# Patient Record
Sex: FEMALE | Race: OTHER | HISPANIC OR LATINO
[De-identification: names, ages, dates, MRNs, and addresses within clinical notes are randomized per-mention and may not be internally consistent; named-entity substitution may affect disease eponyms.]

---

## 2022-03-30 PROBLEM — Z00.00 ENCOUNTER FOR PREVENTIVE HEALTH EXAMINATION: Status: ACTIVE | Noted: 2022-03-30

## 2022-04-18 ENCOUNTER — APPOINTMENT (OUTPATIENT)
Dept: OTOLARYNGOLOGY | Facility: CLINIC | Age: 32
End: 2022-04-18
Payer: COMMERCIAL

## 2022-04-18 VITALS
TEMPERATURE: 97.8 F | DIASTOLIC BLOOD PRESSURE: 78 MMHG | SYSTOLIC BLOOD PRESSURE: 109 MMHG | WEIGHT: 123 LBS | HEIGHT: 63 IN | HEART RATE: 84 BPM | BODY MASS INDEX: 21.79 KG/M2

## 2022-04-18 DIAGNOSIS — Z72.89 OTHER PROBLEMS RELATED TO LIFESTYLE: ICD-10-CM

## 2022-04-18 DIAGNOSIS — E78.00 PURE HYPERCHOLESTEROLEMIA, UNSPECIFIED: ICD-10-CM

## 2022-04-18 DIAGNOSIS — H69.93 UNSPECIFIED EUSTACHIAN TUBE DISORDER, BILATERAL: ICD-10-CM

## 2022-04-18 DIAGNOSIS — Z78.9 OTHER SPECIFIED HEALTH STATUS: ICD-10-CM

## 2022-04-18 DIAGNOSIS — H93.8X9 OTHER SPECIFIED DISORDERS OF EAR, UNSPECIFIED EAR: ICD-10-CM

## 2022-04-18 PROCEDURE — 92567 TYMPANOMETRY: CPT

## 2022-04-18 PROCEDURE — 99204 OFFICE O/P NEW MOD 45 MIN: CPT

## 2022-04-18 RX ORDER — PSEUDOEPHEDRINE HYDROCHLORIDE 60 MG/1
60 TABLET ORAL
Qty: 30 | Refills: 0 | Status: ACTIVE | COMMUNITY
Start: 2022-04-18 | End: 1900-01-01

## 2022-04-18 NOTE — ASSESSMENT
[FreeTextEntry1] : 31F here for initial evaluation. For the past 4 months, she c/o constellation of sx related to her ears. There is ear fullness/pressure with constant need to pop her ears and/or move/crack her jaw. She also sometimes feels a sensation of 'air' in her ear, usually when she wakes up in the morning. Both ears are affected, but the left is much more noticeable. There is no otorrhea, otalgia, tinnitus or vertigo. She saw an ENT and at that time, exam and audiometry were normal and she was given flonase; she has been on flonase for 3 months without improvement.\par She thinks she may have had covid around the time her sx started, 12/2021. Audiometry from today shows normal right tympanometry and eustachian tube function. The left ear is type Ad with abnormal eustachian tube function, as above. Pure tone testing is normal from prior ENT visit 3 months ago. Complete and comprehensive head and neck exam is unremarkable.\par Sx due to eustachian tube dysfunction. This was discussed at length. Flonase does not help. Can try sudafed and keep sx diary. Also can try  as well for TMJ. Regardless, these sx should likely improve and pt was reassured.

## 2022-04-18 NOTE — PHYSICAL EXAM
[de-identified] : EAC clear, TM intact and mobile, ME clear [Midline] : trachea located in midline position [Normal] : no rashes

## 2022-04-18 NOTE — HISTORY OF PRESENT ILLNESS
[de-identified] : 31F here for initial evaluation.\par \par For the past 4 months, she c/o constellation of sx related to her ears. There is ear fullness/pressure with constant need to pop her ears, move/crack her jaw. She also sometimes feels a sensation of 'air' in her ear, usually when she wakes up in the morning. Both ears are affected, but the left is much more noticeable. There is no otorrhea, otalgia, tinnitus or vertigo. She saw an ENT and at that time, exam and audiometry were normal and she was given flonase. She has been on it for 3 months without improvement.\par There are no other otolaryngologic complaints.\par She thinks she may have had covid around the time her sx started, 12/2021.  \par \par Audiometry from today:\par R ear: type A, normal eustachian tube function\par L ear: type Ad, abnormal and insufficient pressure change w insufflation, normal pressure change w swallow\par \par ROS otherwise unremarkable.

## 2022-09-01 ENCOUNTER — APPOINTMENT (OUTPATIENT)
Dept: GASTROENTEROLOGY | Facility: CLINIC | Age: 32
End: 2022-09-01

## 2022-09-01 PROCEDURE — 99204 OFFICE O/P NEW MOD 45 MIN: CPT

## 2022-09-01 RX ORDER — DOCUSATE SODIUM 100 MG/1
100 CAPSULE, LIQUID FILLED ORAL
Qty: 90 | Refills: 3 | Status: ACTIVE | COMMUNITY
Start: 2022-09-01 | End: 1900-01-01

## 2022-09-02 NOTE — HISTORY OF PRESENT ILLNESS
[de-identified] : 31F on synthroid 50mcg, clomid presents for gi eval for sxs for months \par having excess heartburn especially red wine, red sauce\par at night would need to burp after dinner, going to bed and when she woke up \par changed her eating habits and has improved \par rectal pain when going to the bathroom and bleeding, mucus\par constipated a lot but this time was worse\par usually q2days\par FHX- colon polyps, dads mom colon cancer\par no EIMS\par etoh weekend, no smoking drugs \par \par ROS:\par Constitutional: No weight loss, fevers\par ENT: No deafness\par Eyes: No blindness\par Neck: No lymphadenopathy\par Chest: No shortness of breath,  cough\par Cardiac: No chest pain, palpitations\par Vascular: No leg swelling\par GI: No abdominal pain, nausea, vomiting, diarrhea, constipation, rectal bleeding, melena, dysphagia, unless otherwise noted in HPI\par : No dysuria, dark urine\par Skin: No rashes, lesions, jaundice\par Heme: No bleeding\par Endocrine: No DM unless otherwise stated in HPI\par \par Physical Exam:\par VS noted below\par General: alert, comfortable,in no acute stress\par Eyes: normal sclera, anicteric\par Neck: normal, supple, no neck mass\par Pulm: no respiratory distress\par Heart: s1s2\par Abd: soft, non-tender, non-distended, normal bowel sounds; no appreciable hepatosplenomegaly, no masses palpated\par Rectal: NO FISSURES, HEMORRHOIDS \par Ext: warm and well perfused, no edema\par Skin: no rashes, no jaundice\par Neuro: alert, grossly nonfocal\par \par Labs/imaging/prior endoscopic results were reviewed to the extent available and pertinent findings noted\par

## 2022-09-02 NOTE — ASSESSMENT
[FreeTextEntry1] : 31F presents with constipation and rectal bleeding\par - likely healed fissure or hemorrhoids but exam WNL \par - for rectal bleeding recommend colonoscopy, r/ b/a/i discussed and pt agreeable\par - labs, stool tests to evaluate for inflammation, other root cause of constipaiton\par - bowel regimen\par - avoid nsaids, alcohol, smoking and other gut toxins\par - counseled extensively on diet, lifestyle, gutbrain axis and modulation\par - f/u after above\par

## 2022-09-12 LAB
ALBUMIN SERPL ELPH-MCNC: 5.1 G/DL
ALP BLD-CCNC: 54 U/L
ALT SERPL-CCNC: 15 U/L
ANION GAP SERPL CALC-SCNC: 16 MMOL/L
AST SERPL-CCNC: 18 U/L
BASOPHILS # BLD AUTO: 0.03 K/UL
BASOPHILS NFR BLD AUTO: 0.5 %
BILIRUB SERPL-MCNC: 0.4 MG/DL
BUN SERPL-MCNC: 6 MG/DL
CALCIUM SERPL-MCNC: 10.3 MG/DL
CALPROTECTIN FECAL: 174 UG/G
CHLORIDE SERPL-SCNC: 104 MMOL/L
CO2 SERPL-SCNC: 25 MMOL/L
CREAT SERPL-MCNC: 0.69 MG/DL
CRP SERPL-MCNC: <3 MG/L
EGFR: 117 ML/MIN/1.73M2
EOSINOPHIL # BLD AUTO: 0.06 K/UL
EOSINOPHIL NFR BLD AUTO: 1 %
GLIADIN IGA SER QL: <5 UNITS
GLIADIN IGG SER QL: <5 UNITS
GLIADIN PEPTIDE IGA SER-ACNC: NEGATIVE
GLIADIN PEPTIDE IGG SER-ACNC: NEGATIVE
GLUCOSE SERPL-MCNC: 102 MG/DL
HCT VFR BLD CALC: 44.2 %
HGB BLD-MCNC: 14.1 G/DL
IGA SER QL IEP: 155 MG/DL
IMM GRANULOCYTES NFR BLD AUTO: 0.2 %
LYMPHOCYTES # BLD AUTO: 1.85 K/UL
LYMPHOCYTES NFR BLD AUTO: 30.1 %
MAN DIFF?: NORMAL
MCHC RBC-ENTMCNC: 30.1 PG
MCHC RBC-ENTMCNC: 31.9 GM/DL
MCV RBC AUTO: 94.4 FL
MONOCYTES # BLD AUTO: 0.4 K/UL
MONOCYTES NFR BLD AUTO: 6.5 %
NEUTROPHILS # BLD AUTO: 3.8 K/UL
NEUTROPHILS NFR BLD AUTO: 61.7 %
PLATELET # BLD AUTO: 322 K/UL
POTASSIUM SERPL-SCNC: 4.1 MMOL/L
PROT SERPL-MCNC: 7.4 G/DL
RBC # BLD: 4.68 M/UL
RBC # FLD: 13.6 %
SODIUM SERPL-SCNC: 145 MMOL/L
TSH SERPL-ACNC: 2.12 UIU/ML
TTG IGA SER IA-ACNC: <1.2 U/ML
TTG IGA SER-ACNC: NEGATIVE
TTG IGG SER IA-ACNC: <1.2 U/ML
TTG IGG SER IA-ACNC: NEGATIVE
WBC # FLD AUTO: 6.15 K/UL

## 2022-09-13 ENCOUNTER — APPOINTMENT (OUTPATIENT)
Dept: GASTROENTEROLOGY | Facility: CLINIC | Age: 32
End: 2022-09-13

## 2022-09-13 PROCEDURE — 99443: CPT

## 2022-09-23 RX ORDER — POLYETHYLENE GLYCOL 3350 AND ELECTROLYTES WITH LEMON FLAVOR 236; 22.74; 6.74; 5.86; 2.97 G/4L; G/4L; G/4L; G/4L; G/4L
236 POWDER, FOR SOLUTION ORAL
Qty: 1 | Refills: 0 | Status: ACTIVE | COMMUNITY
Start: 2022-09-23 | End: 1900-01-01

## 2022-09-23 RX ORDER — POLYETHYLENE GLYCOL 3350 AND ELECTROLYTES WITH LEMON FLAVOR 236; 22.74; 6.74; 5.86; 2.97 G/4L; G/4L; G/4L; G/4L; G/4L
236 POWDER, FOR SOLUTION ORAL
Qty: 1 | Refills: 0 | Status: ACTIVE | COMMUNITY
Start: 2022-09-20 | End: 1900-01-01

## 2022-09-26 ENCOUNTER — RESULT REVIEW (OUTPATIENT)
Age: 32
End: 2022-09-26

## 2022-09-26 ENCOUNTER — APPOINTMENT (OUTPATIENT)
Age: 32
End: 2022-09-26

## 2022-09-26 PROCEDURE — 45380 COLONOSCOPY AND BIOPSY: CPT

## 2022-09-27 ENCOUNTER — TRANSCRIPTION ENCOUNTER (OUTPATIENT)
Age: 32
End: 2022-09-27

## 2022-10-04 ENCOUNTER — APPOINTMENT (OUTPATIENT)
Dept: GASTROENTEROLOGY | Facility: CLINIC | Age: 32
End: 2022-10-04

## 2022-10-04 PROCEDURE — 99443: CPT

## 2022-10-04 RX ORDER — POLYETHYLENE GLYCOL 3350 17 G/17G
17 POWDER, FOR SOLUTION ORAL
Qty: 1 | Refills: 2 | Status: ACTIVE | COMMUNITY
Start: 2022-10-04 | End: 1900-01-01

## 2022-10-11 ENCOUNTER — NON-APPOINTMENT (OUTPATIENT)
Age: 32
End: 2022-10-11

## 2022-10-12 ENCOUNTER — APPOINTMENT (OUTPATIENT)
Dept: COLORECTAL SURGERY | Facility: CLINIC | Age: 32
End: 2022-10-12

## 2022-10-12 ENCOUNTER — NON-APPOINTMENT (OUTPATIENT)
Age: 32
End: 2022-10-12

## 2022-10-12 VITALS
DIASTOLIC BLOOD PRESSURE: 81 MMHG | BODY MASS INDEX: 21.26 KG/M2 | HEIGHT: 63 IN | TEMPERATURE: 98.1 F | HEART RATE: 116 BPM | SYSTOLIC BLOOD PRESSURE: 134 MMHG | WEIGHT: 120 LBS

## 2022-10-12 DIAGNOSIS — K62.5 HEMORRHAGE OF ANUS AND RECTUM: ICD-10-CM

## 2022-10-12 DIAGNOSIS — K59.00 CONSTIPATION, UNSPECIFIED: ICD-10-CM

## 2022-10-12 PROCEDURE — 99203 OFFICE O/P NEW LOW 30 MIN: CPT | Mod: 25

## 2022-10-12 PROCEDURE — 46600 DIAGNOSTIC ANOSCOPY SPX: CPT

## 2022-10-12 NOTE — ASSESSMENT
[FreeTextEntry1] : Exam findings and diagnosis were discussed at length with patient.\par Pain and rectal bleeding after colonoscopy in setting of constipation.\par Symptoms now improved/resolved with supportive care.\par No acute findings on anoscopy.\par Continue bowel regimen and medical management of constipation.\par All questions were answered, patient expressed understanding, and is agreeable to this plan.\par

## 2022-10-12 NOTE — HISTORY OF PRESENT ILLNESS
[FreeTextEntry1] : 31 y/o F presents for initial evaluation \par Reason for visit: Anal fissure \par Referred by: GI Dr. Le Staley\par PMH hypothyroidism, on Synthroid\par Currently trying to conceive, followed by Fertility and receiving Chlomid and estogen\par \par h/o chronic constipation and elevated fecal calpro (174) 9/2/22 which prompted colonoscopy\par s/p colonoscopy 9/26/22\par \par Pt completed telephone visit w/ GI on 10/4 c/o constipation and rectal bleeding after colonoscopy\par Pt advised to start Calmol -4, miralax and colace to optimize bowel habits and referred to this offie for further evaluation\par \par Per pt, she didn't have BM until one week after colonoscopy and then had large balls of hard stool and patient felt sharp cut like pain and experienced BRB on TP and in toilet bowl.\par \par Pt tried Miralax once daily and Colace BID w/o BM w/o improvement for two days, then increased to Miralax twice daily and Colace 100 mg 3 tablets once daily and has had daily BMs for the last 4 days.\par Pt has been drinking 2L or more of water daily and eating lots of dietary fiber.\par Reports had slightly looser stools yesterday and she did not take morning dose of Miralax today.\par Denies pain or rectal bleeding for the last two days and pt feels much better\par \par Colonoscopy performed [facility] (9/26/22): \par Terminal ileum normal s/p biopsy. Normal colonic mucosa s/p biopsy\par \par Pathology (9/27/22)\par Surgical Pathology Report - Auth (Verified)\par \par Specimen(s) Submitted\par 1 terminal ileum\par 2 right colon\par 3 transverse colon\par 4 left colon\par 5 rectosigmoid\par \par Final Diagnosis\par \par 1. Terminal ileum, biopsy:\par - Minute fragments of terminal ileum with prominent mucosal lymphoid\par aggregate\par - Negative for acute inflammation or ulceration\par - Negative for dysplasia\par - See note\par \par Note: The two fragments are quite small. There is no overt evidence of\par acute inflammation or ulceration.\par \par 2. Right colon, biopsy:\par - Unremarkable colonic mucosa\par - Negative for acute and chronic inflammation, ulceration, or\par dysplasia\par \par 3. Transverse colon, biopsy:\par - Unremarkable colonic mucosa\par - Negative for acute and chronic inflammation, ulceration, or\par dysplasia\par \par 4. Left colon, biopsy:\par - Unremarkable colonic mucosa with prominent intramucosal lymphoid\par aggregate\par - Negative for acute and chronic inflammation, ulceration, or\par dysplasia\par \par 5. Rectosigmoid, biopsy:\par - Colonic mucosa with increased lymphoplasmacytic infiltrate of the\par lamina propria\par - Negative for acute inflammation, ulceration, architectural\par distortion\par - Dysplasia not identified\par Verified by: Minesh Shepard M.D.\par \par Denies FMH CRC or IBD\par Denies ASA/NSAID use\par

## 2022-10-12 NOTE — PHYSICAL EXAM
[FreeTextEntry1] : Medical assistant present for duration of physical examination\par \par General no acute distress, alert and oriented\par Psych calm, pleasant demeanor, responding appropriately to questions\par Nonlabored breathing\par Ambulating without assistance\par Skin normal color and pigment, no visible lesions or rashes\par \par Anorectal Exam:\par Inspection no erythema, induration or fluctuance, no skin excoriation, no fissure, no external hemorrhoids \par KATALINA nontender, no masses palpated, no blood on gloved finger\par \par Procedure: Anoscopy\par \par Pre procedure Diagnosis: anorectal pain\par Post procedure Diagnosis: anorectal pain\par Anesthesia: none\par Estimated blood loss: none\par Specimen: none\par Complications: none\par \par Consent obtained. Anoscopy was performed by passing a lighted anoscope with lubricant jelly into the anal canal and the entire anal mucosal surface was inspected. Findings included no fissure, mild internal hemorrhoids without inflammation\par \par Patient tolerated examination and procedure well.\par \par \par